# Patient Record
Sex: FEMALE | Race: WHITE | NOT HISPANIC OR LATINO | Employment: FULL TIME | URBAN - METROPOLITAN AREA
[De-identification: names, ages, dates, MRNs, and addresses within clinical notes are randomized per-mention and may not be internally consistent; named-entity substitution may affect disease eponyms.]

---

## 2018-12-17 ENCOUNTER — APPOINTMENT (OUTPATIENT)
Dept: WOMENS IMAGING | Facility: HOSPITAL | Age: 48
End: 2018-12-17

## 2018-12-17 PROCEDURE — 77067 SCR MAMMO BI INCL CAD: CPT | Performed by: RADIOLOGY

## 2018-12-17 PROCEDURE — 77063 BREAST TOMOSYNTHESIS BI: CPT | Performed by: RADIOLOGY

## 2019-01-28 ENCOUNTER — APPOINTMENT (OUTPATIENT)
Dept: WOMENS IMAGING | Facility: HOSPITAL | Age: 49
End: 2019-01-28

## 2019-01-28 PROCEDURE — G0279 TOMOSYNTHESIS, MAMMO: HCPCS | Performed by: RADIOLOGY

## 2019-01-28 PROCEDURE — 77065 DX MAMMO INCL CAD UNI: CPT | Performed by: RADIOLOGY

## 2019-01-28 PROCEDURE — 77061 BREAST TOMOSYNTHESIS UNI: CPT | Performed by: RADIOLOGY

## 2019-01-28 PROCEDURE — 76641 ULTRASOUND BREAST COMPLETE: CPT | Performed by: RADIOLOGY

## 2020-12-18 ENCOUNTER — APPOINTMENT (OUTPATIENT)
Dept: WOMENS IMAGING | Facility: HOSPITAL | Age: 50
End: 2020-12-18

## 2020-12-18 PROCEDURE — 77067 SCR MAMMO BI INCL CAD: CPT | Performed by: RADIOLOGY

## 2020-12-18 PROCEDURE — 77063 BREAST TOMOSYNTHESIS BI: CPT | Performed by: RADIOLOGY

## 2021-06-28 ENCOUNTER — AMBULATORY SURGICAL CENTER (OUTPATIENT)
Dept: URBAN - METROPOLITAN AREA SURGERY 17 | Facility: SURGERY | Age: 51
End: 2021-06-28
Payer: COMMERCIAL

## 2021-06-28 VITALS
OXYGEN SATURATION: 97 % | RESPIRATION RATE: 3 BRPM | WEIGHT: 165 LBS | SYSTOLIC BLOOD PRESSURE: 111 MMHG | HEART RATE: 50 BPM | HEART RATE: 75 BPM | DIASTOLIC BLOOD PRESSURE: 64 MMHG | SYSTOLIC BLOOD PRESSURE: 92 MMHG | SYSTOLIC BLOOD PRESSURE: 98 MMHG | HEART RATE: 66 BPM | TEMPERATURE: 98.1 F | RESPIRATION RATE: 22 BRPM | RESPIRATION RATE: 11 BRPM | HEART RATE: 77 BPM | RESPIRATION RATE: 16 BRPM | OXYGEN SATURATION: 98 % | DIASTOLIC BLOOD PRESSURE: 54 MMHG | HEART RATE: 57 BPM | DIASTOLIC BLOOD PRESSURE: 59 MMHG | DIASTOLIC BLOOD PRESSURE: 65 MMHG | SYSTOLIC BLOOD PRESSURE: 100 MMHG | SYSTOLIC BLOOD PRESSURE: 95 MMHG | OXYGEN SATURATION: 100 % | SYSTOLIC BLOOD PRESSURE: 91 MMHG | TEMPERATURE: 98.4 F | SYSTOLIC BLOOD PRESSURE: 102 MMHG | SYSTOLIC BLOOD PRESSURE: 101 MMHG | RESPIRATION RATE: 12 BRPM | DIASTOLIC BLOOD PRESSURE: 57 MMHG | HEIGHT: 65 IN | HEART RATE: 65 BPM | HEART RATE: 68 BPM | RESPIRATION RATE: 15 BRPM | DIASTOLIC BLOOD PRESSURE: 60 MMHG | RESPIRATION RATE: 18 BRPM | DIASTOLIC BLOOD PRESSURE: 55 MMHG | RESPIRATION RATE: 24 BRPM

## 2021-06-28 DIAGNOSIS — D12.2 BENIGN NEOPLASM OF ASCENDING COLON: ICD-10-CM

## 2021-06-28 DIAGNOSIS — K63.5 POLYP OF COLON: ICD-10-CM

## 2021-06-28 DIAGNOSIS — Z12.11 ENCOUNTER FOR SCREENING FOR MALIGNANT NEOPLASM OF COLON: ICD-10-CM

## 2021-06-28 LAB
GI HISTOLOGY: A. UNSPECIFIED: (no result)
GI HISTOLOGY: B. UNSPECIFIED: (no result)
GI HISTOLOGY: PDF REPORT: (no result)

## 2021-06-28 PROCEDURE — 45380 COLONOSCOPY AND BIOPSY: CPT | Mod: 33 | Performed by: INTERNAL MEDICINE

## 2021-06-30 ENCOUNTER — OFFICE VISIT (OUTPATIENT)
Dept: ORTHOPEDIC SURGERY | Facility: CLINIC | Age: 51
End: 2021-06-30

## 2021-06-30 ENCOUNTER — OFFICE (OUTPATIENT)
Dept: URBAN - METROPOLITAN AREA PATHOLOGY 4 | Facility: PATHOLOGY | Age: 51
End: 2021-06-30
Payer: COMMERCIAL

## 2021-06-30 VITALS — BODY MASS INDEX: 27.32 KG/M2 | WEIGHT: 170 LBS | TEMPERATURE: 96.9 F | HEIGHT: 66 IN

## 2021-06-30 DIAGNOSIS — Z12.11 ENCOUNTER FOR SCREENING FOR MALIGNANT NEOPLASM OF COLON: ICD-10-CM

## 2021-06-30 DIAGNOSIS — Z78.0 POSTMENOPAUSAL: ICD-10-CM

## 2021-06-30 DIAGNOSIS — S92.415A NONDISPLACED FRACTURE OF PROXIMAL PHALANX OF LEFT GREAT TOE, INITIAL ENCOUNTER FOR CLOSED FRACTURE: Primary | ICD-10-CM

## 2021-06-30 DIAGNOSIS — D12.2 BENIGN NEOPLASM OF ASCENDING COLON: ICD-10-CM

## 2021-06-30 PROCEDURE — 88305 TISSUE EXAM BY PATHOLOGIST: CPT | Mod: 33 | Performed by: INTERNAL MEDICINE

## 2021-06-30 PROCEDURE — 99204 OFFICE O/P NEW MOD 45 MIN: CPT | Performed by: PHYSICIAN ASSISTANT

## 2021-06-30 PROCEDURE — 28490 TREAT BIG TOE FRACTURE: CPT | Performed by: PHYSICIAN ASSISTANT

## 2021-06-30 NOTE — PROGRESS NOTES
"Chief Complaint  Pain of the Left Foot and Establish Care    Subjective    History of Present Illness      Heather Finch is a 51 y.o. female who presents to [facility] for complaint of left foot-great toe pain secondary to injury. She states she was sitting with her left foot crossed over her right leg when he kid ran by hitting the foot.  She states she is really unsure what happened to the toe.  Her pain is described as a moderate aching pain that is intermittent.  Her symptoms are worse with any weightbearing on the foot.  Her symptoms are improved with rest.      Objective   Vital Signs:   Temp 96.9 °F (36.1 °C)   Ht 167.6 cm (66\")   Wt 77.1 kg (170 lb)   BMI 27.44 kg/m²     Physical Exam  Vitals signs and nursing note reviewed.   Constitutional:       Appearance: Normal appearance.   Pulmonary:      Effort: Pulmonary effort is normal.   Skin:     General: Skin is warm and dry.      Capillary Refill: Capillary refill takes less than 2 seconds.   Neurological:      General: No focal deficit present.      Mental Status: He is alert and oriented to person, place, and time. Mental status is at baseline.   Psychiatric:         Mood and Affect: Mood normal.         Behavior: Behavior normal.         Thought Content: Thought content normal.         Judgment: Judgment normal.     Ortho Exam   LEFT foot  Positive for swelling and tenderness with palpation of the base of the left great toe, medially.      Appropriate amounts of swelling and bruising noted.  Dorsiflexion and plantarflexion are WNL.   The ankle mortise is stable.  There is no evidence of a compartmental syndrome or instability of the midfoot. Lisfranc joint is stable.   Dorsalis pedis and posterior tibial artery pulses are palpable.  Common peroneal nerve function is well preserved.  Unable to adequately observe gait due to the inability to bear any weight on the lower extremity.        Result Review :   Radiologic studies - " see below for interpretation  Left foot xrays 3views were performed at [facility] on 6/29/2021. These images were independently viewed and interpreted by myself, my impression as follows:   · Nondisplaced intra-articular fracture at the base of the 1st proximal phalanx medially.          Assessment   Assessment and Plan    Problem List Items Addressed This Visit        Other    Nondisplaced fracture of proximal phalanx of left great toe, initial encounter for closed fracture - Primary      Other Visit Diagnoses     Postmenopausal        Relevant Orders    DEXA Bone Density Axial          Follow Up   · Discussion of any imaging in detail. Discussion of orthopaedic goals.  · Risk, benefits, and merits of treatment alternatives reviewed with the patient. Treatment alternatives include: bracing.  Discussed bracing options of a short cam walking boot versus casting.  I did explain that the fracture would likely heal just fine in a Cam walking boot although the concern would be that she would leave it on and not be weightbearing since the great toe carries so much of the body's weight. She states she would like to go ahead and try the cast but does have concerns over being claustrophobic. If she is unable to tolerate the cast we will bring her back for short cam boot.   · Ice, heat, and/or modalities as beneficial  · Cast, splint or brace care and assistive device usage instructions given  · Patient is encouraged to call or return for any issues or concerns.   · Short leg fiberglass splint applied.  · Follow up in 4 weeks  • Patient was given instructions and counseling regarding her condition or for health maintenance advice. Please see specific information pulled into the AVS if appropriate.     Dwight Busby PA-C   Date of Encounter: 6/30/2021   Electronically signed by Dwight Busby PA-C, 06/30/21, 11:47 AM EDT.     EMR Dragon/Transcription disclaimer:  Much of this encounter note is  an electronic transcription/translation of spoken language to printed text. The electronic translation of spoken language may permit erroneous, or at times, nonsensical words or phrases to be inadvertently transcribed; Although I have reviewed the note for such errors, some may still exist.

## 2021-07-12 ENCOUNTER — TELEPHONE (OUTPATIENT)
Dept: ORTHOPEDIC SURGERY | Facility: CLINIC | Age: 51
End: 2021-07-12

## 2021-07-12 DIAGNOSIS — S92.415A NONDISPLACED FRACTURE OF PROXIMAL PHALANX OF LEFT GREAT TOE, INITIAL ENCOUNTER FOR CLOSED FRACTURE: Primary | ICD-10-CM

## 2021-07-12 RX ORDER — IBUPROFEN 800 MG/1
800 TABLET ORAL 3 TIMES DAILY
Qty: 90 TABLET | Refills: 0 | Status: SHIPPED | OUTPATIENT
Start: 2021-07-12 | End: 2021-07-27 | Stop reason: SDUPTHER

## 2021-07-12 NOTE — TELEPHONE ENCOUNTER
Thank you for answering her questions. Please let me know if she has other questions. I have sent in a rx for ibuprofen 800mg.

## 2021-07-13 ENCOUNTER — HOSPITAL ENCOUNTER (OUTPATIENT)
Dept: BONE DENSITY | Facility: HOSPITAL | Age: 51
Discharge: HOME | End: 2021-07-13
Admitting: PHYSICIAN ASSISTANT

## 2021-07-13 DIAGNOSIS — Z78.0 POSTMENOPAUSAL: ICD-10-CM

## 2021-07-13 PROCEDURE — 77080 DXA BONE DENSITY AXIAL: CPT | Performed by: RADIOLOGY

## 2021-07-13 PROCEDURE — 77080 DXA BONE DENSITY AXIAL: CPT

## 2021-07-22 ENCOUNTER — TELEPHONE (OUTPATIENT)
Dept: ORTHOPEDIC SURGERY | Facility: CLINIC | Age: 51
End: 2021-07-22

## 2021-07-22 NOTE — TELEPHONE ENCOUNTER
Caller: Heather Finch    Relationship: Self    Best call back number: [phone]    What form or medical record are you requesting: [facility] PAPERWORK    Who is requesting this form or medical record from you: [facility]    How would you like to receive the form or medical records (pick-up, mail, fax):   FAX: [phone]      Timeframe paperwork needed: 7-22-21    Additional notes:  PATIENT CALLED BACK AND WOULD LIKE TO CONFIRM THAT [facility] PAPERWORK (WAS REFAXED TODAY) WAS RECEIVED AT PRACTICE. PATIENT STATES THAT [facility] PAPERWORK NEEDS TO HAVE START DATE OF 6-30-21. PLEASE CALL PATIENT BACK TO CONFIRM.

## 2021-07-22 NOTE — TELEPHONE ENCOUNTER
Provider: RUTHY ENGLE PA-C  Caller: ASAEL WADE  Relationship to Patient: SELF    Phone Number:  [phone]  Reason for Call:PATIENT CALLED TO CONFIRM THAT HER [facility] PAPERWORK WAS RECEIVED IN OFFICE. IF SO, SHE WANTS TO KNOW IF THE PAPER WORK BEEN COMPLETED AND SENT BACK TO [facility].  PATIENT WANTS SOMEONE TO  CALL HER BACK.

## 2021-07-26 ENCOUNTER — TELEPHONE (OUTPATIENT)
Dept: ORTHOPEDIC SURGERY | Facility: CLINIC | Age: 51
End: 2021-07-26

## 2021-07-26 NOTE — TELEPHONE ENCOUNTER
Provider: RUTHY ENGLE  Caller:  PT  Relationship to Patient: SELF  Phone Number: [phone]  Reason for Call: PT CALLED TO STATE THAT SHE HAD TO REMOVE HER CAST THIS WEEKEND DUE TO TIGHTNESS/SKIN CHANGING COLORS, BUT CONFIRMED APPT 07.27.21. (PT JUST WANTED TO PASS ALONG THE INFO PRIOR TO APPT, NO NEED FOR CALLBACK UNLESS NECESSARY).

## 2021-07-27 ENCOUNTER — OFFICE VISIT (OUTPATIENT)
Dept: ORTHOPEDIC SURGERY | Facility: CLINIC | Age: 51
End: 2021-07-27

## 2021-07-27 ENCOUNTER — HOSPITAL ENCOUNTER (OUTPATIENT)
Dept: GENERAL RADIOLOGY | Facility: HOSPITAL | Age: 51
Discharge: HOME | End: 2021-07-27
Admitting: PHYSICIAN ASSISTANT

## 2021-07-27 VITALS — TEMPERATURE: 97.1 F | BODY MASS INDEX: 26.2 KG/M2 | WEIGHT: 163 LBS | HEIGHT: 66 IN

## 2021-07-27 DIAGNOSIS — S92.415A NONDISPLACED FRACTURE OF PROXIMAL PHALANX OF LEFT GREAT TOE, INITIAL ENCOUNTER FOR CLOSED FRACTURE: Primary | ICD-10-CM

## 2021-07-27 DIAGNOSIS — S92.415D CLOSED NONDISPLACED FRACTURE OF PROXIMAL PHALANX OF LEFT GREAT TOE WITH ROUTINE HEALING, SUBSEQUENT ENCOUNTER: Primary | ICD-10-CM

## 2021-07-27 PROCEDURE — 99024 POSTOP FOLLOW-UP VISIT: CPT | Performed by: PHYSICIAN ASSISTANT

## 2021-07-27 PROCEDURE — 73630 X-RAY EXAM OF FOOT: CPT

## 2021-07-27 RX ORDER — IBUPROFEN 800 MG/1
TABLET ORAL
COMMUNITY
Start: 2021-07-13

## 2021-07-27 NOTE — PROGRESS NOTES
FRACTURE CARE VISIT (Global)      NAME: Heather Finch           : 1970    MRN: [id number]      Chief Complaint   Patient presents with   • Left Foot - Follow-up, Fracture     Date of Fracture: 21  ?     HPI  Heather Finch presents for 4 week follow up s/p fracture of proximal phalanx of left great toe. She reports she removed the cast at home due to feeling of claustrophobia within the last week. She states the foot is still very painful if she puts her weight on it. 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 There was an issue with her FMLA forms being faxed to the wrong number but we have received those as of last week.        Physical Exam  General: alert, appears stated age, cooperative and no distress  Physical Exam:  Signs of infection: [x] no                  [] yes   Swelling: [x] no                  [] yes   Skin wound: [] healing well   [] healed well   [x] skin intact    Motor exam intact: [] no                  [x] yes   Neurovascular exam intact: [] no                  [x] yes   Signs of compartment syndrome: [x] no                  [] yes   Signs of DVT: [x] no                  [] yes   Ecchymosis: [x] no                  [] yes     Musculoskeletal:   LEFT foot  Positive for tenderness with palpation of the base of the left great toe, medially.      Appropriate amounts of swelling and bruising noted.  Dorsiflexion and plantarflexion are WNL.   The ankle mortise is stable.  There is no evidence of a compartmental syndrome or instability of the midfoot. Lisfranc joint is stable.   Dorsalis pedis and posterior tibial artery pulses are palpable.  Common peroneal nerve function is well preserved.  Unable to adequately observe gait due to the inability to bear any weight on the lower extremity.       Diagnostic Data:  Left foot xrays 3 views were ordered by Dwight Busby PA-C. Performed at [facility] on 7/27/21. Images were independently viewed and interpreted by myself, my impression as follows:  Findings: slight callus formation noted at the nondisplaced fracture of the proximal phalanx of left great toe  Bony lesion: no  Soft tissues: within normal limits  Joint spaces: within normal limits  Hardware appropriately positioned: not applicable  Prior studies available for comparison: yes          Assessment/Plan   Assessment:    1. Closed nondisplaced fracture of proximal phalanx of left great toe with routine healing, subsequent encounter          Plan:       • Discussion of any imaging in detail. Discussion of orthopaedic goals.  • Ice, heat, and/or modalities as beneficial  • Patient is encouraged to call or return for any issues or concerns.  • Short leg ankle immobilizer/cam walking boot. Continue to remain off foot as much as possible. May attempt slight WB with boot.  • Follow up in 4 weeks         Dwight Busby PA-C  07/27/2021     Electronically signed by Dwight Busby PA-C, 07/27/21, 8:58 AM EDT.    EMR Dragon/Transcription disclaimer:  Much of this encounter note is an electronic transcription/translation of spoken language to printed text. The electronic translation of spoken language may permit erroneous, or at times, nonsensical words or phrases to be inadvertently transcribed; Although I have reviewed the note for such errors, some may still exist.

## 2021-07-30 ENCOUNTER — TELEPHONE (OUTPATIENT)
Dept: ORTHOPEDIC SURGERY | Facility: CLINIC | Age: 51
End: 2021-07-30

## 2021-07-30 NOTE — TELEPHONE ENCOUNTER
Zulema and myself discussed this and she has talked to the patient. Discussed with her ordering a venous doppler to r/o blood clot. Patient stated she felt like it was okay and she would see how it does over the weekend but was informed if anything changes or is concerning to go to ER.

## 2021-07-30 NOTE — TELEPHONE ENCOUNTER
"PATIENT CALLED AND STATED THAT SHE IS HAVING CONSIDERABLE SWELLING IN HER FOOT AND ANKLE.  SHE STATED THAT IF SHE IS UP ON IT FOR EVEN 10 MINUTES IT SWELLS.  SHE ALSO STATED THAT IT IS \"BLANCHABLE AND MODELED\".  PATIENT IS S/P LEFT PROXIMAL PHALANX FRACTURE DOI 6/26/21.    PLEASE ADVISE  "

## 2021-08-02 ENCOUNTER — TELEPHONE (OUTPATIENT)
Dept: ORTHOPEDIC SURGERY | Facility: CLINIC | Age: 51
End: 2021-08-02

## 2021-08-02 NOTE — TELEPHONE ENCOUNTER
PATIENT IS CONCERNED WITH HOW HER FOOT IS LOOKING AND HAS A FEW QUESTIONS.  SHE WOULD LIKE TO SPEAK WITH CRISS.  SHE IS QUESTIONING WHETHER OR NOT SHE MAY NEED A DOPPLER

## 2021-08-03 ENCOUNTER — OFFICE VISIT (OUTPATIENT)
Dept: ORTHOPEDIC SURGERY | Facility: CLINIC | Age: 51
End: 2021-08-03

## 2021-08-03 VITALS — TEMPERATURE: 98 F | HEIGHT: 66 IN | WEIGHT: 163 LBS | BODY MASS INDEX: 26.2 KG/M2

## 2021-08-03 DIAGNOSIS — M79.89 PAIN AND SWELLING OF LEFT LOWER LEG: Primary | ICD-10-CM

## 2021-08-03 DIAGNOSIS — M79.662 PAIN AND SWELLING OF LEFT LOWER LEG: Primary | ICD-10-CM

## 2021-08-03 DIAGNOSIS — S92.415D CLOSED NONDISPLACED FRACTURE OF PROXIMAL PHALANX OF LEFT GREAT TOE WITH ROUTINE HEALING, SUBSEQUENT ENCOUNTER: ICD-10-CM

## 2021-08-03 PROCEDURE — 99024 POSTOP FOLLOW-UP VISIT: CPT | Performed by: PHYSICIAN ASSISTANT

## 2021-08-03 NOTE — PROGRESS NOTES
FRACTURE CARE VISIT (Global)      NAME: Heather Finch           : 1970    MRN: [id number]      Chief Complaint   Patient presents with   • Left Foot - Follow-up, Pain     Date of Fracture: 21  ?     HPI  Heather Finch presents for 5 week follow up s/p fracture of proximal phalanx of left great toe. She  returns for complaint of mottling of the foot and cold feeling. She has noticed it being worse anytime she gets up and moves around. She denies pain in the calf but does have some swelling.         Physical Exam  General: alert, appears stated age, cooperative and no distress  Physical Exam:  Signs of infection: [x] no                  [] yes   Swelling: [x] no                  [] yes   Skin wound: [] healing well   [] healed well   [x] skin intact    Motor exam intact: [] no                  [x] yes   Neurovascular exam intact: [] no                  [x] yes   Signs of compartment syndrome: [x] no                  [] yes   Signs of DVT: [x] no                  [] yes   Ecchymosis: [x] no                  [] yes     Musculoskeletal:   LEFT foot  Positive for tenderness with palpation of the base of the left great toe, medially.      Appropriate amounts of swelling and bruising noted.  Dorsiflexion and plantarflexion are WNL.   The ankle mortise is stable.  There is no evidence of a compartmental syndrome or instability of the midfoot. Lisfranc joint is stable.   Dorsalis pedis and posterior tibial artery pulses are palpable.  Common peroneal nerve function is well preserved.  Unable to adequately observe gait due to the inability to bear any weight on the lower extremity.       Diagnostic Data:  Stat venous doppler of the LLE performed at [facility], 8/3/21, summary of impression below:  · No evidence of DVT         Assessment/Plan   Assessment:    1. Pain and swelling of left lower leg    2. Closed nondisplaced fracture of proximal phalanx of left great toe with routine healing, subsequent  encounter          Plan:   Orders Placed This Encounter   Procedures   • US Venous Doppler Lower Extremity Right (duplex)   • US Venous Doppler Lower Extremity Left (duplex)      • Discussion of any imaging in detail. Discussion of orthopaedic goals.  • Ice, heat, and/or modalities as beneficial  • Patient is encouraged to call or return for any issues or concerns.  • Short leg ankle immobilizer/cam walking boot. Continue to remain off foot as much as possible.   • Follow up as scheduled         Dwight Busby PA-C  Date of encounter: 8/3/2021     Electronically signed by Dwight Busby PA-C, 08/09/21, 12:48 PM EDT.     EMR Dragon/Transcription disclaimer:  Much of this encounter note is an electronic transcription/translation of spoken language to printed text. The electronic translation of spoken language may permit erroneous, or at times, nonsensical words or phrases to be inadvertently transcribed; Although I have reviewed the note for such errors, some may still exist.

## 2021-08-05 ENCOUNTER — TELEPHONE (OUTPATIENT)
Dept: ORTHOPEDIC SURGERY | Facility: CLINIC | Age: 51
End: 2021-08-05

## 2021-08-05 NOTE — TELEPHONE ENCOUNTER
Caller: ASAEL WADE    Relationship: SELF    Best call back number: [phone]    What form or medical record are you requesting: SHORT TERM DISABILITY    Who is requesting this form or medical record from you: EMPLOYER  How would you like to receive the form or medical records (pick-up, mail, fax): FAX  If fax, what is the fax number: [phone]    Timeframe paperwork needed: ASAP    Additional notes: PT STATES THAT SHE WAS CHECKING TO SEE IF PRACTICE HAS RECEIVED SHORT TERM DISABILITY PAPERWORK. SHE STATES THAT SHE NEEDS IT FILLED OUT ASAP SO SHE CAN START GETTING A PAYCHECK. PLEASE CONTACT PATIENT AT ABOVE PHONE NUMBER

## 2021-08-06 NOTE — TELEPHONE ENCOUNTER
I SPOKE WITH PATIENT AND LET HER KNOW WE HAVE NOT RECEIVED ANY PAPERWORK WITH HER NAME ON IT FOR HER DISABILITY SHE WAS IN UNDERSTANDING AND WOULD CALL AND LET THEM KNOW TO REFAX WITH HER NAME ON IT

## 2021-08-09 PROBLEM — M79.662 PAIN AND SWELLING OF LEFT LOWER LEG: Status: ACTIVE | Noted: 2021-08-09

## 2021-08-09 PROBLEM — M79.89 PAIN AND SWELLING OF LEFT LOWER LEG: Status: ACTIVE | Noted: 2021-08-09

## 2021-08-09 PROBLEM — S92.415D NONDISPLACED FRACTURE OF PROXIMAL PHALANX OF LEFT GREAT TOE WITH ROUTINE HEALING: Status: ACTIVE | Noted: 2021-08-09

## 2021-08-20 DIAGNOSIS — S92.415D CLOSED NONDISPLACED FRACTURE OF PROXIMAL PHALANX OF LEFT GREAT TOE WITH ROUTINE HEALING, SUBSEQUENT ENCOUNTER: Primary | ICD-10-CM

## 2021-08-24 ENCOUNTER — OFFICE VISIT (OUTPATIENT)
Dept: ORTHOPEDIC SURGERY | Facility: CLINIC | Age: 51
End: 2021-08-24

## 2021-08-24 ENCOUNTER — HOSPITAL ENCOUNTER (OUTPATIENT)
Dept: GENERAL RADIOLOGY | Facility: HOSPITAL | Age: 51
Discharge: HOME | End: 2021-08-24
Admitting: PHYSICIAN ASSISTANT

## 2021-08-24 VITALS — TEMPERATURE: 95.3 F | WEIGHT: 160 LBS | BODY MASS INDEX: 25.71 KG/M2 | HEIGHT: 66 IN

## 2021-08-24 DIAGNOSIS — S92.415D CLOSED NONDISPLACED FRACTURE OF PROXIMAL PHALANX OF LEFT GREAT TOE WITH ROUTINE HEALING, SUBSEQUENT ENCOUNTER: ICD-10-CM

## 2021-08-24 DIAGNOSIS — S92.415D CLOSED NONDISPLACED FRACTURE OF PROXIMAL PHALANX OF LEFT GREAT TOE WITH ROUTINE HEALING, SUBSEQUENT ENCOUNTER: Primary | ICD-10-CM

## 2021-08-24 PROCEDURE — 99024 POSTOP FOLLOW-UP VISIT: CPT | Performed by: PHYSICIAN ASSISTANT

## 2021-08-24 PROCEDURE — 73630 X-RAY EXAM OF FOOT: CPT

## 2021-08-24 NOTE — PROGRESS NOTES
FRACTURE CARE VISIT (Global)      NAME: Heather Finch           : 1970    MRN: [id number]      Chief Complaint   Patient presents with   • Left Foot - Follow-up, Fracture     Date of Fracture: 21  ?  HPI   Heather Finch presents for 8 week follow up s/p fracture of proximal phalanx of left great toe. She  has been NWB with cam walking boot due to the location of the fracture and it being that the majority of the weight hits at the great toe/MCP. She reports improvement in pain but states she has been very compliant and has not tried to bear weight.        Physical Exam  General: alert, appears stated age, cooperative and no distress  Physical Exam:  Signs of infection: [x] no                  [] yes   Swelling: [x] no                  [] yes   Skin wound: [] healing well   [] healed well   [x] skin intact    Motor exam intact: [] no                  [x] yes   Neurovascular exam intact: [] no                  [x] yes   Signs of compartment syndrome: [x] no                  [] yes   Signs of DVT: [x] no                  [] yes   Ecchymosis: [x] no                  [] yes     Musculoskeletal:   LEFT foot  Positive for mild tenderness with palpation of the base of the left great toe, medially.      Bruising resolved, swelling has improved.  Dorsiflexion and plantarflexion are limited just from immobilization.    The ankle mortise is stable.  There is no evidence of a compartmental syndrome or instability of the midfoot. Lisfranc joint is stable.   Dorsalis pedis and posterior tibial artery pulses are palpable.  Common peroneal nerve function is well preserved.  She also has moderate tenderness at the ball of the MCP/head of metatarsal.      Diagnostic Data:  Left foot xrays 3 views were ordered by Dwight Busby PA-C. Performed at [facility] on 21. Images were independently viewed and interpreted by myself, my impression as follows:  Findings: callus formation noted at the  nondisplaced fracture of the proximal phalanx of left great toe, the healing is slow but there is evidence of continued healing  Bony lesion: no  Soft tissues: within normal limits  Joint spaces: within normal limits  Hardware appropriately positioned: not applicable  Prior studies available for comparison: yes         Assessment/Plan   Assessment:    1. Closed nondisplaced fracture of proximal phalanx of left great toe with routine healing, subsequent encounter          Plan:       • Discussion of any imaging in detail. Discussion of orthopaedic goals.  • Ice, heat, and/or modalities as beneficial  • Patient is encouraged to call or return for any issues or concerns.  • Short leg ankle immobilizer/cam walking boot, begin PWB with boot. After approximately 1-2 weeks, if there is no pain, begin PWB without boot. Discussed possibility of osteogenesis stimulator based on how slow the healing is.  • Follow up as scheduled         Dwight Busby PA-C  Date of encounter: 8/24/2021     Electronically signed by Dwight Busby PA-C, 08/24/21, 2:14 PM EDT.     EMR Dragon/Transcription disclaimer:  Much of this encounter note is an electronic transcription/translation of spoken language to printed text. The electronic translation of spoken language may permit erroneous, or at times, nonsensical words or phrases to be inadvertently transcribed; Although I have reviewed the note for such errors, some may still exist.

## 2021-09-10 DIAGNOSIS — S92.415D CLOSED NONDISPLACED FRACTURE OF PROXIMAL PHALANX OF LEFT GREAT TOE WITH ROUTINE HEALING, SUBSEQUENT ENCOUNTER: Primary | ICD-10-CM

## 2021-09-28 ENCOUNTER — HOSPITAL ENCOUNTER (OUTPATIENT)
Dept: GENERAL RADIOLOGY | Facility: HOSPITAL | Age: 51
Discharge: HOME | End: 2021-09-28
Admitting: PHYSICIAN ASSISTANT

## 2021-09-28 ENCOUNTER — OFFICE VISIT (OUTPATIENT)
Dept: ORTHOPEDIC SURGERY | Facility: CLINIC | Age: 51
End: 2021-09-28

## 2021-09-28 VITALS — BODY MASS INDEX: 24.75 KG/M2 | HEIGHT: 66 IN | WEIGHT: 154 LBS | TEMPERATURE: 96.9 F

## 2021-09-28 DIAGNOSIS — S92.415D CLOSED NONDISPLACED FRACTURE OF PROXIMAL PHALANX OF LEFT GREAT TOE WITH ROUTINE HEALING, SUBSEQUENT ENCOUNTER: ICD-10-CM

## 2021-09-28 DIAGNOSIS — S92.415K: Primary | ICD-10-CM

## 2021-09-28 DIAGNOSIS — M25.562 ACUTE PAIN OF LEFT KNEE: ICD-10-CM

## 2021-09-28 PROCEDURE — 99024 POSTOP FOLLOW-UP VISIT: CPT | Performed by: PHYSICIAN ASSISTANT

## 2021-09-28 PROCEDURE — 73630 X-RAY EXAM OF FOOT: CPT

## 2021-09-28 NOTE — PROGRESS NOTES
FRACTURE CARE VISIT (Global)      NAME: Heather Finch           : 1970    MRN: [id number]      Chief Complaint   Patient presents with   • Left Foot - Follow-up, Fracture     Date of Fracture: 21  ?  HPI   Heather Finch presents for 12 week follow up s/p fracture of proximal phalanx of left great toe. She   has been in a short leg ankle immobilizer/cam walking boot and was instructed last visit to begin PWB with boot and after approximately 1-2 weeks, if there is no pain, begin PWB without boot. She states she did not weight bear without the boot because she did not think she was suppose to. She also reports pain at the medial aspect of the knee and overall weakness of the leg.        Physical Exam  General: alert, appears stated age, cooperative and no distress  Physical Exam:  Signs of infection: [x] no                  [] yes   Swelling: [x] no                  [] yes   Skin wound: [] healing well   [] healed well   [x] skin intact    Motor exam intact: [] no                  [x] yes   Neurovascular exam intact: [] no                  [x] yes   Signs of compartment syndrome: [x] no                  [] yes   Signs of DVT: [x] no                  [] yes   Ecchymosis: [x] no                  [] yes     Musculoskeletal:   LEFT foot  Positive for mild tenderness with palpation of the base of the left great toe, medially.      Dorsiflexion and plantarflexion are limited from immobilization.    The ankle mortise is stable.  There is no evidence of a compartmental syndrome or instability of the midfoot. Lisfranc joint is stable.   Dorsalis pedis and posterior tibial artery pulses are palpable.  Common peroneal nerve function is well preserved.  She continues to have moderate tenderness at the ball of the MCP/head of metatarsal.      Diagnostic Data:  Left foot xrays 3 views were ordered by Dwight Busby PA-C. Performed at [facility] on 21. Images were independently viewed and  interpreted by myself, my impression as follows:  Findings: no visible progression of healing noted at the base of the proximal phalanx fracture  Bony lesion: no  Soft tissues: within normal limits  Joint spaces: within normal limits  Hardware appropriately positioned: not applicable  Prior studies available for comparison: yes         Assessment/Plan   Assessment:    1. Closed nondisplaced fracture of proximal phalanx of left great toe with nonunion, subsequent encounter    2. Acute pain of left knee          Plan:   Orders Placed This Encounter   Procedures   • Osteogenesis Stimulator   • Ambulatory Referral to Physical Therapy Evaluate and treat (s/p left great toe proximal phalanx fracture-delayed healing)         • Discussion of any imaging in detail. Discussion of orthopaedic goals. 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 Discussed the decrease in bone density seen on her plain films of foot and how much loss has occurred since last visit. Discussed importance of increasing her weightbearing to help reverse those changes.  • Ice, heat, and/or modalities as beneficial  • Patient is encouraged to call or return for any issues or concerns.  • Given there is no progression of healing of the fracture I would also recommend a bone stimulator.   • Follow up 2 weeks for re-evaluation for work status.         Dwight Busby PA-C  Date of encounter: 9/28/2021   Electronically signed by Dwight Busby PA-C, 09/28/21, 12:13 PM EDT.     EMR Dragon/Transcription disclaimer:  Much of this encounter note is an electronic transcription/translation of spoken language to printed text. The electronic translation of spoken language may permit erroneous, or at times, nonsensical words or phrases to be inadvertently transcribed; Although I have  reviewed the note for such errors, some may still exist.

## 2021-10-08 ENCOUNTER — TELEPHONE (OUTPATIENT)
Dept: ORTHOPEDIC SURGERY | Facility: CLINIC | Age: 51
End: 2021-10-08

## 2021-10-08 NOTE — TELEPHONE ENCOUNTER
Provider: RUTHY ENGLE    Caller: ASAEL WADE    Relationship to Patient: SELF    Phone Number: [phone]    Reason for Call: PATIENT CALLING TO GIVE FAX NUMBER OF WHERE TO SEND HER DISABILITY PAPERWORK TO. PLEASE FAX [phone]

## 2021-10-12 ENCOUNTER — OFFICE VISIT (OUTPATIENT)
Dept: ORTHOPEDIC SURGERY | Facility: CLINIC | Age: 51
End: 2021-10-12

## 2021-10-12 VITALS — BODY MASS INDEX: 24.75 KG/M2 | HEIGHT: 66 IN | WEIGHT: 154 LBS

## 2021-10-12 DIAGNOSIS — S92.415K: Primary | ICD-10-CM

## 2021-10-12 PROCEDURE — 99213 OFFICE O/P EST LOW 20 MIN: CPT | Performed by: PHYSICIAN ASSISTANT

## 2021-10-17 NOTE — PROGRESS NOTES
FRACTURE CARE VISIT      NAME: Heather Finch           : 1970    MRN: [id number]      Chief Complaint   Patient presents with   • Left Foot - Follow-up     Date of Fracture: 21  ?  HPI   Heather Finch presents for 14 week follow up s/p fracture of proximal phalanx of left great toe. 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 To add to the difficulties with the fracture, she had also become very deconditioned in her leg. Since her last visit, 2 weeks ago, she has started in PT. She was also started on a bone stimulator since last visit. She has been placing more weight on the foot, as instructed.        Physical Exam  General: alert, appears stated age, cooperative and no distress  Physical Exam:  Signs of infection: [x] no                  [] yes   Swelling: [x] no                  [] yes   Skin wound: [] healing well   [] healed well   [x] skin intact    Motor exam intact: [] no                  [x] yes   Neurovascular exam intact: [] no                  [x] yes   Signs of compartment syndrome: [x] no                  [] yes   Signs of DVT: [x] no                  [] yes   Ecchymosis: [x] no                  [] yes     Musculoskeletal:   LEFT foot  Positive for mild tenderness with palpation of the base of the left great toe, medially.      Dorsiflexion and plantarflexion are limited from immobilization.    The ankle mortise is stable.  There is no evidence of a compartmental syndrome or instability of the midfoot. Lisfranc joint is stable.   Dorsalis pedis and posterior tibial artery pulses are palpable.  Common peroneal nerve function is well preserved.  She continues to have moderate tenderness at the ball of the MCP/head of metatarsal.      Diagnostic Data:  To be obtained at next visit         Assessment/Plan   Assessment:    1. Closed nondisplaced fracture of proximal phalanx of left great toe with  nonunion, subsequent encounter        I spent 25 minutes caring for Heather on this date of service. This time includes time spent by me in the following activities:performing a medically appropriate examination and/or evaluation , counseling and educating the patient/family/caregiver, ordering medications, tests, or procedures and documenting information in the medical record.   Plan:          • Again discussed the importance of ryan bearing to reverse changes of the bone. Also discussed possibility of RSD.   • Ice, heat, and/or modalities as beneficial  • Patient is encouraged to call or return for any issues or concerns.  • Continue with bone stimulator.   • Follow up 4 weeks with xrays         Dwight Busby PA-C  Date of encounter: 10/12/2021   Electronically signed by Dwight Busby PA-C, 10/17/21, 8:34 AM EDT.     EMR Dragon/Transcription disclaimer:  Much of this encounter note is an electronic transcription/translation of spoken language to printed text. The electronic translation of spoken language may permit erroneous, or at times, nonsensical words or phrases to be inadvertently transcribed; Although I have reviewed the note for such errors, some may still exist.

## 2021-11-05 DIAGNOSIS — S92.415D CLOSED NONDISPLACED FRACTURE OF PROXIMAL PHALANX OF LEFT GREAT TOE WITH ROUTINE HEALING, SUBSEQUENT ENCOUNTER: Primary | ICD-10-CM

## 2021-11-10 ENCOUNTER — OFFICE VISIT (OUTPATIENT)
Dept: ORTHOPEDIC SURGERY | Facility: CLINIC | Age: 51
End: 2021-11-10

## 2021-11-10 ENCOUNTER — HOSPITAL ENCOUNTER (OUTPATIENT)
Dept: GENERAL RADIOLOGY | Facility: HOSPITAL | Age: 51
Discharge: HOME | End: 2021-11-10
Admitting: PHYSICIAN ASSISTANT

## 2021-11-10 VITALS — WEIGHT: 153 LBS | HEIGHT: 66 IN | TEMPERATURE: 97.5 F | BODY MASS INDEX: 24.59 KG/M2

## 2021-11-10 DIAGNOSIS — S92.415G CLOSED NONDISPLACED FRACTURE OF PROXIMAL PHALANX OF LEFT GREAT TOE WITH DELAYED HEALING, SUBSEQUENT ENCOUNTER: Primary | ICD-10-CM

## 2021-11-10 DIAGNOSIS — S92.415D CLOSED NONDISPLACED FRACTURE OF PROXIMAL PHALANX OF LEFT GREAT TOE WITH ROUTINE HEALING, SUBSEQUENT ENCOUNTER: ICD-10-CM

## 2021-11-10 PROCEDURE — 99213 OFFICE O/P EST LOW 20 MIN: CPT | Performed by: PHYSICIAN ASSISTANT

## 2021-11-10 PROCEDURE — 73630 X-RAY EXAM OF FOOT: CPT

## 2021-11-10 NOTE — PROGRESS NOTES
NAME: Heather Finch           : 1970    MRN: [id number]      Chief Complaint   Patient presents with   • Left Foot - Pain   • Left Ankle - Pain     Date of Fracture: 21  ?  HPI   Heather Finch presents for 18 week follow up s/p fracture of proximal phalanx of left great toe. 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 To add insult to injury, she had also become very deconditioned in her leg. She has been using the bone stimulator and working diligently with PT and notices improvement in her pain. She is still sore and realizes she will be the more she is on it but she is ready to get back to work now that she is working on building muscle strength back.       Physical Exam  General: alert, appears stated age, cooperative and no distress  Physical Exam:  Signs of infection: [x] no                  [] yes   Swelling: [x] no                  [] yes   Skin wound: [] healing well   [] healed well   [x] skin intact    Motor exam intact: [] no                  [x] yes   Neurovascular exam intact: [] no                  [x] yes   Signs of compartment syndrome: [x] no                  [] yes   Signs of DVT: [x] no                  [] yes   Ecchymosis: [x] no                  [] yes     Musculoskeletal:   LEFT foot  Positive for minimal tenderness with palpation of the base of the left great toe, medially.      Dorsiflexion and plantarflexion are limited from immobilization.    The ankle mortise is stable.  There is no evidence of a compartmental syndrome or instability of the midfoot. Lisfranc joint is stable.   Dorsalis pedis and posterior tibial artery pulses are palpable.  Common peroneal nerve function is well preserved.  She continues to have mild tenderness at the ball of the MCP/head of metatarsal.      Diagnostic Data:  LEFT foot xrays   3 views were ordered by Dwight Busby PA-C. Performed at [facility] on 11/10/2021. Images were independently viewed and interpreted by myself, my impression as follows:   Findings: Healing noted of displaced fracture at proximal phalanx of left toe medially  Bony lesion: no  Soft tissues: within normal limits  Joint spaces: within normal limits  Hardware appropriately positioned: not applicable  Prior studies available for comparison: yes            Assessment/Plan   Assessment:    1. Closed nondisplaced fracture of proximal phalanx of left great toe with delayed healing, subsequent encounter        Plan:        • Continue with physical therapy and weightbearing.  • Ice, heat, and/or modalities as beneficial  • Patient is encouraged to call or return for any issues or concerns.  • Continue with bone stimulator.   • Follow up 6-8 weeks with xrays  • May return to work on Monday, 11/15/2021 without estrictions.  Note has been given to patient.         Dwight Busby PA-C  Date of encounter: 11/10/2021   Electronically signed by Dwight Busby PA-C, 11/10/21, 2:21 PM EST.      EMR Dragon/Transcription disclaimer:  Much of this encounter note is an electronic transcription/translation of spoken language to printed text. The electronic translation of spoken language may permit erroneous, or at times, nonsensical words or phrases to be inadvertently transcribed; Although I have reviewed the note for such errors, some may still exist.

## 2021-12-27 DIAGNOSIS — S92.415D CLOSED NONDISPLACED FRACTURE OF PROXIMAL PHALANX OF LEFT GREAT TOE WITH ROUTINE HEALING, SUBSEQUENT ENCOUNTER: Primary | ICD-10-CM

## 2021-12-27 DIAGNOSIS — S92.415G CLOSED NONDISPLACED FRACTURE OF PROXIMAL PHALANX OF LEFT GREAT TOE WITH DELAYED HEALING, SUBSEQUENT ENCOUNTER: ICD-10-CM

## 2021-12-27 PROBLEM — S82.90XS LATE EFFECT OF FRACTURE OF LOWER EXTREMITIES: Status: ACTIVE | Noted: 2021-12-27

## 2021-12-27 PROBLEM — M79.609 PAIN IN LIMB: Status: ACTIVE | Noted: 2021-12-27

## 2021-12-28 ENCOUNTER — OFFICE VISIT (OUTPATIENT)
Dept: ORTHOPEDIC SURGERY | Facility: CLINIC | Age: 51
End: 2021-12-28

## 2021-12-28 ENCOUNTER — HOSPITAL ENCOUNTER (OUTPATIENT)
Dept: GENERAL RADIOLOGY | Facility: HOSPITAL | Age: 51
Discharge: HOME | End: 2021-12-28
Admitting: PHYSICIAN ASSISTANT

## 2021-12-28 VITALS — TEMPERATURE: 98 F | BODY MASS INDEX: 24.59 KG/M2 | WEIGHT: 153 LBS | HEIGHT: 66 IN

## 2021-12-28 DIAGNOSIS — S92.415G CLOSED NONDISPLACED FRACTURE OF PROXIMAL PHALANX OF LEFT GREAT TOE WITH DELAYED HEALING, SUBSEQUENT ENCOUNTER: Primary | ICD-10-CM

## 2021-12-28 DIAGNOSIS — S92.415G CLOSED NONDISPLACED FRACTURE OF PROXIMAL PHALANX OF LEFT GREAT TOE WITH DELAYED HEALING, SUBSEQUENT ENCOUNTER: ICD-10-CM

## 2021-12-28 DIAGNOSIS — S92.415D CLOSED NONDISPLACED FRACTURE OF PROXIMAL PHALANX OF LEFT GREAT TOE WITH ROUTINE HEALING, SUBSEQUENT ENCOUNTER: ICD-10-CM

## 2021-12-28 PROCEDURE — 99213 OFFICE O/P EST LOW 20 MIN: CPT | Performed by: PHYSICIAN ASSISTANT

## 2021-12-28 PROCEDURE — 73630 X-RAY EXAM OF FOOT: CPT

## 2021-12-30 PROBLEM — S92.415G: Status: ACTIVE | Noted: 2021-08-09

## 2021-12-30 NOTE — PROGRESS NOTES
NAME: Heather Finch           : 1970    MRN: [id number]      Chief Complaint   Patient presents with   • Left Foot - Follow-up, Pain     Date of Fracture: 21  ?  HPI   Heather Finch presents for 25 week follow up s/p fracture of proximal phalanx of left great toe. She has returned to work as oft he . She states she has been working extra shifts to make up for the time she has been off. She continues with the bone stimulator and states her pain has improved. She continues to have issues with putting her weight on the left foot and trying to go up on the ball of the foot. The discomfort is across the top of the foot. She has continued to do exercises at home.        Physical Exam  General: alert, appears stated age, cooperative and no distress  Physical Exam:  Signs of infection: [x] no                  [] yes   Swelling: [x] no                  [] yes   Skin wound: [] healing well   [] healed well   [x] skin intact    Motor exam intact: [] no                  [x] yes   Neurovascular exam intact: [] no                  [x] yes   Signs of compartment syndrome: [x] no                  [] yes   Signs of DVT: [x] no                  [] yes   Ecchymosis: [x] no                  [] yes     Musculoskeletal:   LEFT foot  Positive for minimal tenderness with palpation of the base of the left great toe, medially.      Dorsiflexion and plantarflexion are limited from immobilization.    The ankle mortise is stable.  There is no evidence of a compartmental syndrome or instability of the midfoot. Lisfranc joint is stable.   Dorsalis pedis and posterior tibial artery pulses are palpable.  Common peroneal nerve function is well preserved.  She continues to have mild tenderness at the ball of the MCP/head of metatarsal.      Diagnostic Data:  LEFT foot xrays   3 views were ordered by Dwight Busby PA-C. Performed at [facility] on 2021. Images were independently viewed and  interpreted by myself, my impression as follows:   Findings: Continued healing noted of displaced fracture at proximal phalanx of left toe medially  Bony lesion: no  Soft tissues: within normal limits  Joint spaces: within normal limits  Hardware appropriately positioned: not applicable  Prior studies available for comparison: yes            Assessment/Plan   Assessment:    1. Closed nondisplaced fracture of proximal phalanx of left great toe with delayed healing, subsequent encounter        Plan:        • Continue with physical therapy and weightbearing.  • Ice, heat, and/or modalities as beneficial  • Patient is encouraged to call or return for any issues or concerns.  • Continue with bone stimulator.   • Follow up PRN  • Discussed ordering MRI but she does not wish to do that at this point. Also discussed continuing PT and she will continue with exercises at home.            Dwight Busby PA-C  Date of encounter: 12/28/2021   Electronically signed by Dwight Busby PA-C, 12/30/21, 10:08 AM EST.       BR[facility] Dragon/Transcription disclaimer:  Much of this encounter note is an electronic transcription/translation of spoken language to printed text. The electronic translation of spoken language may permit erroneous, or at times, nonsensical words or phrases to be inadvertently transcribed; Although I have reviewed the note for such errors, some may still exist.

## 2024-04-11 ENCOUNTER — TELEPHONE (OUTPATIENT)
Dept: ORTHOPEDIC SURGERY | Age: 54
End: 2024-04-11
Payer: COMMERCIAL

## 2024-04-11 NOTE — TELEPHONE ENCOUNTER
Radiology disc provided by patient labeled LEFT FOOT dated 06/29/2021 - no facility info available - shredded.